# Patient Record
Sex: FEMALE
[De-identification: names, ages, dates, MRNs, and addresses within clinical notes are randomized per-mention and may not be internally consistent; named-entity substitution may affect disease eponyms.]

---

## 2023-08-17 ENCOUNTER — APPOINTMENT (OUTPATIENT)
Dept: ORTHOPEDIC SURGERY | Facility: CLINIC | Age: 53
End: 2023-08-17
Payer: COMMERCIAL

## 2023-08-17 VITALS — WEIGHT: 293 LBS | HEIGHT: 70 IN | BODY MASS INDEX: 41.95 KG/M2

## 2023-08-17 DIAGNOSIS — M51.37 OTHER INTERVERTEBRAL DISC DEGENERATION, LUMBOSACRAL REGION: ICD-10-CM

## 2023-08-17 DIAGNOSIS — Z78.9 OTHER SPECIFIED HEALTH STATUS: ICD-10-CM

## 2023-08-17 DIAGNOSIS — N02.8 RECURRENT AND PERSISTENT HEMATURIA WITH OTHER MORPHOLOGIC CHANGES: ICD-10-CM

## 2023-08-17 DIAGNOSIS — M48.061 SPINAL STENOSIS, LUMBAR REGION WITHOUT NEUROGENIC CLAUDICATION: ICD-10-CM

## 2023-08-17 DIAGNOSIS — M51.26 OTHER INTERVERTEBRAL DISC DISPLACEMENT, LUMBAR REGION: ICD-10-CM

## 2023-08-17 DIAGNOSIS — M47.817 SPONDYLOSIS W/OUT MYELOPATHY OR RADICULOPATHY, LUMBOSACRAL REGION: ICD-10-CM

## 2023-08-17 DIAGNOSIS — M54.16 RADICULOPATHY, LUMBAR REGION: ICD-10-CM

## 2023-08-17 DIAGNOSIS — M51.36 OTHER INTERVERTEBRAL DISC DEGENERATION, LUMBAR REGION: ICD-10-CM

## 2023-08-17 PROBLEM — Z00.00 ENCOUNTER FOR PREVENTIVE HEALTH EXAMINATION: Status: ACTIVE | Noted: 2023-08-17

## 2023-08-17 PROCEDURE — 99204 OFFICE O/P NEW MOD 45 MIN: CPT

## 2023-08-17 RX ORDER — SEMAGLUTIDE 1.34 MG/ML
2 INJECTION, SOLUTION SUBCUTANEOUS
Refills: 0 | Status: ACTIVE | COMMUNITY

## 2023-08-17 RX ORDER — VALSARTAN 40 MG/1
40 TABLET, COATED ORAL
Refills: 0 | Status: ACTIVE | COMMUNITY

## 2023-08-17 RX ORDER — ATORVASTATIN CALCIUM 10 MG/1
10 TABLET, FILM COATED ORAL
Refills: 0 | Status: ACTIVE | COMMUNITY

## 2023-08-17 RX ORDER — GABAPENTIN 100 MG/1
100 CAPSULE ORAL 3 TIMES DAILY
Qty: 90 | Refills: 1 | Status: ACTIVE | COMMUNITY
Start: 2023-08-17 | End: 1900-01-01

## 2023-08-17 NOTE — PHYSICAL EXAM
[TextEntry] : Constitutional: Well groomed and developed.  Respiratory: Normal, unlabored breathing. No use of accessory muscles.  Skin: No rashes or ulcers. Skin warm and dry.  Psychiatric: Oriented to time, place, person and event. No acute distress. Gait: Heel to toe Patient able to walk on toes and heels.    Lumbar spine:  Posture: Normal on coronal and sagittal alignment  AROM:  Flexion 70  Extension 10  Moderate pain with simulated truncal motion   Tenderness:  Thoracic: No tenderness on palpation  Lumbar: Moderate tenderness on palpation  Sacrum/coccyx: no tenderness on palpation  Greater trochanteric bursa:  No tenderness  PSIS: None    Motor:                         R             L LE:                                IS                    5             5 Quad              5             5 TA                  5             5 EHL                5             5 Gastroc         5             5                 R  L DTR: Patella  2+  2+ Achilles  2+  2+  Sensory: Light touch sensation decreased L L4 distribution Babinski's Sign: Negative bilaterally  Straight leg raise test: Negative bilaterally  ANSON test: negative bilaterally

## 2023-08-17 NOTE — DATA REVIEWED
[MRI] : MRI [Lumbar Spine] : lumbar spine [Report was reviewed and noted in the chart] : The report was reviewed and noted in the chart [I independently reviewed and interpreted images and report] : I independently reviewed and interpreted images and report [FreeTextEntry1] : Left foraminal HNP L4-5 Central HNP L5-S1 Bilateral foraminal stenosis L5-S1 Left foraminal stenosis L4-5  DDD L5-S1 with modic changes

## 2023-08-17 NOTE — ASSESSMENT
[FreeTextEntry1] : Left foraminal HNP L4-5 Central HNP L5-S1 Bilateral foraminal stenosis L5-S1 Left foraminal stenosis L4-5  DDD L5-S1 with modic changes   Patient does not wish to have epidural at this time  Recommend: - NSAID - Heating pad - Muscle relaxer - Core strengthening exercise - Hamstring stretching exercise Patient is given back rehabilitation exercise book.  Follow up in 2 months

## 2023-08-17 NOTE — HISTORY OF PRESENT ILLNESS
[Lower back] : lower back [Dull/Aching] : dull/aching [Constant] : constant [Sitting] : sitting [Walking] : walking [Full time] : Work status: full time [de-identified] : Patient presents today with lower back evaluation per Neurologist for bilateral leg pain and weakness. Patient states she does not have lower back pain. Patient states this started after testing positive for COVID-19 on 4/1/22. Patient states she has issues with her balance. Patient states she has difficulty walking. Patient states she has trouble climbing stairs. Patient admits to finishing 2 rounds of steroids. Patient had lumbar spine MRI at .  Bilateral Legs Active 7-10/10 Rest 5/10 [] : no [de-identified] : lumbar spine MRI at  [de-identified] :

## 2023-08-25 ENCOUNTER — OFFICE (OUTPATIENT)
Dept: URBAN - METROPOLITAN AREA CLINIC 12 | Facility: CLINIC | Age: 53
Setting detail: OPHTHALMOLOGY
End: 2023-08-25
Payer: COMMERCIAL

## 2023-08-25 DIAGNOSIS — G43.109: ICD-10-CM

## 2023-08-25 DIAGNOSIS — H40.013: ICD-10-CM

## 2023-08-25 DIAGNOSIS — H43.393: ICD-10-CM

## 2023-08-25 PROCEDURE — 92014 COMPRE OPH EXAM EST PT 1/>: CPT | Performed by: OPHTHALMOLOGY

## 2023-08-25 PROCEDURE — 92250 FUNDUS PHOTOGRAPHY W/I&R: CPT | Performed by: OPHTHALMOLOGY

## 2023-08-25 PROCEDURE — 92083 EXTENDED VISUAL FIELD XM: CPT | Performed by: OPHTHALMOLOGY

## 2023-08-25 ASSESSMENT — REFRACTION_MANIFEST
OD_CYLINDER: -2.75
OS_CYLINDER: -2.75
OS_SPHERE: -2.50
OS_VA1: 20/NI
OS_SPHERE: -2.50
OS_CYLINDER: -2.75
OD_VA1: 20/20-1
OD_AXIS: 076
OD_AXIS: 80
OS_AXIS: 087
OD_ADD: +2.00
OD_VA1: 20/NI
OD_SPHERE: -2.50
OD_CYLINDER: -3.00
OS_ADD: +2.00
OU_VA: 20/20
OD_SPHERE: -2.50
OS_AXIS: 90
OS_VA1: 20/20-1

## 2023-08-25 ASSESSMENT — REFRACTION_CURRENTRX
OD_OVR_VA: 20/
OS_AXIS: 091
OD_OVR_VA: 20/
OD_AXIS: 087
OS_OVR_VA: 20/
OS_VPRISM_DIRECTION: PROGS
OS_ADD: +2.00
OS_AXIS: 76
OD_VPRISM_DIRECTION: BF
OS_AXIS: 083
OS_CYLINDER: -2.50
OD_ADD: +2.25
OS_OVR_VA: 20/
OD_ADD: +2.00
OS_ADD: +2.25
OD_SPHERE: -2.50
OS_SPHERE: -2.50
OD_VPRISM_DIRECTION: PROGS
OD_CYLINDER: -3.00
OS_ADD: +2.00
OD_CYLINDER: -2.50
OS_OVR_VA: 20/
OS_VPRISM_DIRECTION: PROGS
OS_SPHERE: -3.00
OD_OVR_VA: 20/
OS_VPRISM_DIRECTION: PROGS
OD_CYLINDER: -2.75
OD_SPHERE: -3.25
OD_AXIS: 079
OD_AXIS: 75
OS_CYLINDER: -3.25
OS_SPHERE: -2.75
OD_VPRISM_DIRECTION: PROGS
OD_SPHERE: -3.25
OD_ADD: +2.00
OS_CYLINDER: -2.50

## 2023-08-25 ASSESSMENT — KERATOMETRY
OS_K2POWER_DIOPTERS: 44.00
OD_K2POWER_DIOPTERS: 43.75
OD_AXISANGLE_DEGREES: 169
OD_K1POWER_DIOPTERS: 42.00
OS_K1POWER_DIOPTERS: 42.00
OS_AXISANGLE_DEGREES: 176

## 2023-08-25 ASSESSMENT — REFRACTION_AUTOREFRACTION
OS_CYLINDER: -2.75
OS_AXIS: 88
OD_CYLINDER: -2.75
OD_AXIS: 78
OD_SPHERE: -2.50
OS_SPHERE: -2.25

## 2023-08-25 ASSESSMENT — SPHEQUIV_DERIVED
OS_SPHEQUIV: -3.875
OS_SPHEQUIV: -3.625
OD_SPHEQUIV: -4
OD_SPHEQUIV: -3.875
OD_SPHEQUIV: -3.875
OS_SPHEQUIV: -3.875

## 2023-08-25 ASSESSMENT — VISUAL ACUITY
OS_BCVA: 20/20-2
OD_BCVA: 20/25+2

## 2023-08-25 ASSESSMENT — AXIALLENGTH_DERIVED
OS_AL: 25.4162
OD_AL: 25.4696
OS_AL: 25.4162
OS_AL: 25.3037
OD_AL: 25.4696
OD_AL: 25.5265

## 2023-08-25 ASSESSMENT — CONFRONTATIONAL VISUAL FIELD TEST (CVF)
OD_FINDINGS: FULL
OS_FINDINGS: FULL

## 2023-08-25 ASSESSMENT — TONOMETRY
OD_IOP_MMHG: 17
OS_IOP_MMHG: 14

## 2023-08-25 ASSESSMENT — PACHYMETRY
OD_CT_CORRECTION: 1
OD_CT_UM: 536
OS_CT_CORRECTION: 1
OS_CT_UM: 538

## 2023-10-18 ENCOUNTER — APPOINTMENT (OUTPATIENT)
Dept: ORTHOPEDIC SURGERY | Facility: CLINIC | Age: 53
End: 2023-10-18

## 2024-04-17 ENCOUNTER — OFFICE (OUTPATIENT)
Dept: URBAN - METROPOLITAN AREA CLINIC 1 | Facility: CLINIC | Age: 54
Setting detail: OPHTHALMOLOGY
End: 2024-04-17
Payer: COMMERCIAL

## 2024-04-17 DIAGNOSIS — H43.393: ICD-10-CM

## 2024-04-17 DIAGNOSIS — H16.223: ICD-10-CM

## 2024-04-17 PROCEDURE — 99213 OFFICE O/P EST LOW 20 MIN: CPT

## 2024-10-02 ENCOUNTER — OFFICE (OUTPATIENT)
Dept: URBAN - METROPOLITAN AREA CLINIC 12 | Facility: CLINIC | Age: 54
Setting detail: OPHTHALMOLOGY
End: 2024-10-02
Payer: COMMERCIAL

## 2024-10-02 VITALS — WEIGHT: 245 LBS | HEIGHT: 69 IN

## 2024-10-02 DIAGNOSIS — G43.109: ICD-10-CM

## 2024-10-02 DIAGNOSIS — H16.223: ICD-10-CM

## 2024-10-02 DIAGNOSIS — H40.013: ICD-10-CM

## 2024-10-02 DIAGNOSIS — H43.393: ICD-10-CM

## 2024-10-02 PROCEDURE — 92083 EXTENDED VISUAL FIELD XM: CPT | Performed by: OPHTHALMOLOGY

## 2024-10-02 PROCEDURE — 92014 COMPRE OPH EXAM EST PT 1/>: CPT | Performed by: OPHTHALMOLOGY

## 2024-10-02 PROCEDURE — 92133 CPTRZD OPH DX IMG PST SGM ON: CPT | Performed by: OPHTHALMOLOGY

## 2024-10-02 ASSESSMENT — REFRACTION_MANIFEST
OS_ADD: +2.00
OU_VA: 20/20
OD_AXIS: 076
OD_VA1: 20/NI
OD_CYLINDER: -2.75
OD_SPHERE: -2.50
OS_CYLINDER: -2.75
OD_CYLINDER: -3.00
OS_CYLINDER: -2.75
OS_SPHERE: -2.50
OD_VA1: 20/20-1
OD_ADD: +2.00
OD_SPHERE: -2.50
OS_SPHERE: -2.50
OD_AXIS: 80
OS_VA1: 20/NI
OS_AXIS: 087
OS_AXIS: 90
OS_VA1: 20/20-1

## 2024-10-02 ASSESSMENT — PACHYMETRY
OS_CT_CORRECTION: 1
OD_CT_UM: 536
OS_CT_UM: 538
OD_CT_CORRECTION: 1

## 2024-10-02 ASSESSMENT — REFRACTION_CURRENTRX
OD_AXIS: 079
OD_SPHERE: -3.25
OS_ADD: +2.00
OD_SPHERE: -2.75
OD_ADD: +2.25
OS_CYLINDER: -3.25
OD_SPHERE: -2.50
OS_SPHERE: -2.75
OS_OVR_VA: 20/
OS_VPRISM_DIRECTION: PROGS
OD_AXIS: 079
OS_OVR_VA: 20/
OD_OVR_VA: 20/
OD_CYLINDER: -3.00
OD_VPRISM_DIRECTION: PROGS
OD_OVR_VA: 20/
OD_CYLINDER: -2.50
OS_VPRISM_DIRECTION: PROGS
OS_VPRISM_DIRECTION: PROGS
OD_AXIS: 75
OS_ADD: +2.25
OD_OVR_VA: 20/
OD_ADD: +2.25
OS_CYLINDER: -2.50
OS_OVR_VA: 20/
OS_AXIS: 76
OS_ADD: +2.25
OS_SPHERE: -2.50
OD_VPRISM_DIRECTION: PROGS
OD_ADD: +2.00
OS_SPHERE: -2.50
OD_CYLINDER: -2.75
OS_CYLINDER: -2.75
OD_VPRISM_DIRECTION: PROGS
OS_AXIS: 083
OS_AXIS: 089

## 2024-10-02 ASSESSMENT — SUPERFICIAL PUNCTATE KERATITIS (SPK)
OD_SPK: T
OS_SPK: T

## 2024-10-02 ASSESSMENT — VISUAL ACUITY
OS_BCVA: 20/20
OD_BCVA: 20/20-1

## 2024-10-02 ASSESSMENT — KERATOMETRY
OS_AXISANGLE_DEGREES: 176
OS_K1POWER_DIOPTERS: 41.75
OS_K2POWER_DIOPTERS: 44.00
OD_K1POWER_DIOPTERS: 41.75
OD_AXISANGLE_DEGREES: 168
OD_K2POWER_DIOPTERS: 43.75

## 2024-10-02 ASSESSMENT — TONOMETRY
OD_IOP_MMHG: 14
OS_IOP_MMHG: 16

## 2024-10-02 ASSESSMENT — CONFRONTATIONAL VISUAL FIELD TEST (CVF)
OD_FINDINGS: FULL
OS_FINDINGS: FULL

## 2024-10-02 ASSESSMENT — REFRACTION_AUTOREFRACTION
OD_CYLINDER: -3.25
OS_CYLINDER: -3.00
OS_SPHERE: -2.00
OD_SPHERE: -2.00
OS_AXIS: 087
OD_AXIS: 079

## 2025-03-12 ENCOUNTER — NON-APPOINTMENT (OUTPATIENT)
Age: 55
End: 2025-03-12

## 2025-05-19 PROBLEM — Z00.00 ENCOUNTER FOR PREVENTIVE HEALTH EXAMINATION: Status: ACTIVE | Noted: 2025-05-19

## 2025-05-20 ENCOUNTER — APPOINTMENT (OUTPATIENT)
Dept: OTOLARYNGOLOGY | Facility: CLINIC | Age: 55
End: 2025-05-20

## 2025-09-02 ENCOUNTER — APPOINTMENT (OUTPATIENT)
Dept: OTOLARYNGOLOGY | Facility: CLINIC | Age: 55
End: 2025-09-02